# Patient Record
Sex: FEMALE | Race: BLACK OR AFRICAN AMERICAN | NOT HISPANIC OR LATINO | URBAN - METROPOLITAN AREA
[De-identification: names, ages, dates, MRNs, and addresses within clinical notes are randomized per-mention and may not be internally consistent; named-entity substitution may affect disease eponyms.]

---

## 2019-03-27 ENCOUNTER — OUTPATIENT (OUTPATIENT)
Dept: OUTPATIENT SERVICES | Facility: HOSPITAL | Age: 44
LOS: 1 days | End: 2019-03-27
Payer: COMMERCIAL

## 2019-03-27 VITALS
HEART RATE: 68 BPM | DIASTOLIC BLOOD PRESSURE: 89 MMHG | WEIGHT: 233.91 LBS | HEIGHT: 67 IN | SYSTOLIC BLOOD PRESSURE: 143 MMHG | TEMPERATURE: 97 F | RESPIRATION RATE: 16 BRPM | OXYGEN SATURATION: 98 %

## 2019-03-27 DIAGNOSIS — Z98.89 OTHER SPECIFIED POSTPROCEDURAL STATES: Chronic | ICD-10-CM

## 2019-03-27 DIAGNOSIS — I10 ESSENTIAL (PRIMARY) HYPERTENSION: ICD-10-CM

## 2019-03-27 DIAGNOSIS — N84.0 POLYP OF CORPUS UTERI: ICD-10-CM

## 2019-03-27 DIAGNOSIS — Z01.818 ENCOUNTER FOR OTHER PREPROCEDURAL EXAMINATION: ICD-10-CM

## 2019-03-27 DIAGNOSIS — Z98.890 OTHER SPECIFIED POSTPROCEDURAL STATES: Chronic | ICD-10-CM

## 2019-03-27 DIAGNOSIS — D36.9 BENIGN NEOPLASM, UNSPECIFIED SITE: Chronic | ICD-10-CM

## 2019-03-27 LAB
ANION GAP SERPL CALC-SCNC: 5 MMOL/L — SIGNIFICANT CHANGE UP (ref 5–17)
APTT BLD: 29.1 SEC — SIGNIFICANT CHANGE UP (ref 27.5–36.3)
BUN SERPL-MCNC: 10 MG/DL — SIGNIFICANT CHANGE UP (ref 7–18)
CALCIUM SERPL-MCNC: 8.3 MG/DL — LOW (ref 8.4–10.5)
CHLORIDE SERPL-SCNC: 107 MMOL/L — SIGNIFICANT CHANGE UP (ref 96–108)
CO2 SERPL-SCNC: 25 MMOL/L — SIGNIFICANT CHANGE UP (ref 22–31)
CREAT SERPL-MCNC: 0.72 MG/DL — SIGNIFICANT CHANGE UP (ref 0.5–1.3)
GLUCOSE SERPL-MCNC: 81 MG/DL — SIGNIFICANT CHANGE UP (ref 70–99)
HCT VFR BLD CALC: 37.5 % — SIGNIFICANT CHANGE UP (ref 34.5–45)
HGB BLD-MCNC: 11 G/DL — LOW (ref 11.5–15.5)
INR BLD: 1.07 RATIO — SIGNIFICANT CHANGE UP (ref 0.88–1.16)
MCHC RBC-ENTMCNC: 21.7 PG — LOW (ref 27–34)
MCHC RBC-ENTMCNC: 29.3 GM/DL — LOW (ref 32–36)
MCV RBC AUTO: 74.1 FL — LOW (ref 80–100)
NRBC # BLD: 0 /100 WBCS — SIGNIFICANT CHANGE UP (ref 0–0)
PLATELET # BLD AUTO: 316 K/UL — SIGNIFICANT CHANGE UP (ref 150–400)
POTASSIUM SERPL-MCNC: 4 MMOL/L — SIGNIFICANT CHANGE UP (ref 3.5–5.3)
POTASSIUM SERPL-SCNC: 4 MMOL/L — SIGNIFICANT CHANGE UP (ref 3.5–5.3)
PROTHROM AB SERPL-ACNC: 11.9 SEC — SIGNIFICANT CHANGE UP (ref 10–12.9)
RBC # BLD: 5.06 M/UL — SIGNIFICANT CHANGE UP (ref 3.8–5.2)
RBC # FLD: 18.3 % — HIGH (ref 10.3–14.5)
SODIUM SERPL-SCNC: 137 MMOL/L — SIGNIFICANT CHANGE UP (ref 135–145)
WBC # BLD: 5.94 K/UL — SIGNIFICANT CHANGE UP (ref 3.8–10.5)
WBC # FLD AUTO: 5.94 K/UL — SIGNIFICANT CHANGE UP (ref 3.8–10.5)

## 2019-03-27 PROCEDURE — 85027 COMPLETE CBC AUTOMATED: CPT

## 2019-03-27 PROCEDURE — G0463: CPT

## 2019-03-27 PROCEDURE — 86900 BLOOD TYPING SEROLOGIC ABO: CPT

## 2019-03-27 PROCEDURE — 86850 RBC ANTIBODY SCREEN: CPT

## 2019-03-27 PROCEDURE — 80048 BASIC METABOLIC PNL TOTAL CA: CPT

## 2019-03-27 PROCEDURE — 93005 ELECTROCARDIOGRAM TRACING: CPT

## 2019-03-27 PROCEDURE — 36415 COLL VENOUS BLD VENIPUNCTURE: CPT

## 2019-03-27 PROCEDURE — 86901 BLOOD TYPING SEROLOGIC RH(D): CPT

## 2019-03-27 PROCEDURE — 85730 THROMBOPLASTIN TIME PARTIAL: CPT

## 2019-03-27 PROCEDURE — 85610 PROTHROMBIN TIME: CPT

## 2019-03-27 PROCEDURE — 93010 ELECTROCARDIOGRAM REPORT: CPT

## 2019-03-27 RX ORDER — SODIUM CHLORIDE 9 MG/ML
3 INJECTION INTRAMUSCULAR; INTRAVENOUS; SUBCUTANEOUS EVERY 8 HOURS
Qty: 0 | Refills: 0 | Status: DISCONTINUED | OUTPATIENT
Start: 2019-04-04 | End: 2019-04-12

## 2019-03-27 NOTE — H&P PST ADULT - NSANTHNECKRD_ENT_A_CORE
February 26, 2018      Lashae Lopez,   [address]  [facility] [address]           [facility]  [address]  [facility] [address]  Phone: [phone]          Patient: Yasmani Mcknight   MR Number: [id number]   YOB: 2009   Date of Visit: 2/21/2018       Dear Dr. Lashae Lopez:    Thank you for referring Yasmani Mcknight to me for evaluation. Attached you will find relevant portions of my assessment and plan of care.    If you have questions, please do not hesitate to call me. I look forward to following Yasmani Mcknight along with you.    Sincerely,    Tiffany Saldaña, NP    Enclosure  CC:  No Recipients    If you would like to receive this communication electronically, please contact [email or web address] or [phone] to request more information on [employer] access.    For providers and/or their staff who would like to refer a patient to [facility], please contact us through our one-stop-shop provider referral line, [facility], at [phone].    If you feel you have received this communication in error or would no longer like to receive these types of communications, please e-mail [email or web address]         
No

## 2019-03-27 NOTE — H&P PST ADULT - NSICDXFAMILYHX_GEN_ALL_CORE_FT
FAMILY HISTORY:  Mother  Still living? Yes, Estimated age: 61-70  Family history of hypertension, Age at diagnosis: Age Unknown

## 2019-03-27 NOTE — H&P PST ADULT - NSICDXPASTMEDICALHX_GEN_ALL_CORE_FT
PAST MEDICAL HISTORY:  Anemia     Hypertension     Migraines     Obesity (BMI 30-39.9)     Uterine leiomyoma

## 2019-03-27 NOTE — H&P PST ADULT - HISTORY OF PRESENT ILLNESS
44year old female with pmhx of hypertension, migraine headaches, leiomyoma of uterus, left knee torn meniscus and benign right breast lump presents with c/o abdominal cramping and menorrhagia. Patient is here today for presurgical testing for scheduled dilation and curettage, hysteroscopy on 4/4/19

## 2019-03-27 NOTE — H&P PST ADULT - NSICDXPASTSURGICALHX_GEN_ALL_CORE_FT
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

## 2019-03-27 NOTE — H&P PST ADULT - NSICDXPROBLEM_GEN_ALL_CORE_FT
PROBLEM DIAGNOSES  Problem: Hypertension  Assessment and Plan: Patient instructed to take Nifedipine ER 60mg tab po with a sip of water the AM of surgery     Problem: Polyp of corpus uteri  Assessment and Plan: Patient is scheduled for dilation and curettage; hysteroscopy on 4/4/19

## 2019-04-03 ENCOUNTER — TRANSCRIPTION ENCOUNTER (OUTPATIENT)
Age: 44
End: 2019-04-03

## 2019-04-04 ENCOUNTER — OUTPATIENT (OUTPATIENT)
Dept: OUTPATIENT SERVICES | Facility: HOSPITAL | Age: 44
LOS: 1 days | End: 2019-04-04
Payer: COMMERCIAL

## 2019-04-04 ENCOUNTER — RESULT REVIEW (OUTPATIENT)
Age: 44
End: 2019-04-04

## 2019-04-04 VITALS
DIASTOLIC BLOOD PRESSURE: 72 MMHG | TEMPERATURE: 98 F | HEART RATE: 69 BPM | RESPIRATION RATE: 16 BRPM | OXYGEN SATURATION: 99 % | SYSTOLIC BLOOD PRESSURE: 126 MMHG

## 2019-04-04 VITALS
WEIGHT: 233.91 LBS | HEART RATE: 68 BPM | SYSTOLIC BLOOD PRESSURE: 126 MMHG | HEIGHT: 67 IN | TEMPERATURE: 98 F | OXYGEN SATURATION: 99 % | DIASTOLIC BLOOD PRESSURE: 72 MMHG | RESPIRATION RATE: 16 BRPM

## 2019-04-04 DIAGNOSIS — N84.0 POLYP OF CORPUS UTERI: ICD-10-CM

## 2019-04-04 DIAGNOSIS — Z01.818 ENCOUNTER FOR OTHER PREPROCEDURAL EXAMINATION: ICD-10-CM

## 2019-04-04 DIAGNOSIS — Z98.89 OTHER SPECIFIED POSTPROCEDURAL STATES: Chronic | ICD-10-CM

## 2019-04-04 DIAGNOSIS — Z98.890 OTHER SPECIFIED POSTPROCEDURAL STATES: Chronic | ICD-10-CM

## 2019-04-04 DIAGNOSIS — D36.9 BENIGN NEOPLASM, UNSPECIFIED SITE: Chronic | ICD-10-CM

## 2019-04-04 LAB — HCG UR QL: NEGATIVE — SIGNIFICANT CHANGE UP

## 2019-04-04 PROCEDURE — 86901 BLOOD TYPING SEROLOGIC RH(D): CPT

## 2019-04-04 PROCEDURE — 36415 COLL VENOUS BLD VENIPUNCTURE: CPT

## 2019-04-04 PROCEDURE — 86900 BLOOD TYPING SEROLOGIC ABO: CPT

## 2019-04-04 PROCEDURE — 58558 HYSTEROSCOPY BIOPSY: CPT

## 2019-04-04 PROCEDURE — 86850 RBC ANTIBODY SCREEN: CPT

## 2019-04-04 PROCEDURE — 81025 URINE PREGNANCY TEST: CPT

## 2019-04-04 RX ORDER — ACETAMINOPHEN 500 MG
2 TABLET ORAL
Qty: 30 | Refills: 0
Start: 2019-04-04 | End: 2019-04-08

## 2019-04-04 RX ORDER — IBUPROFEN 200 MG
600 TABLET ORAL EVERY 6 HOURS
Qty: 0 | Refills: 0 | Status: DISCONTINUED | OUTPATIENT
Start: 2019-04-04 | End: 2019-04-12

## 2019-04-04 RX ORDER — ONDANSETRON 8 MG/1
4 TABLET, FILM COATED ORAL ONCE
Qty: 0 | Refills: 0 | Status: DISCONTINUED | OUTPATIENT
Start: 2019-04-04 | End: 2019-04-04

## 2019-04-04 RX ORDER — NIFEDIPINE 30 MG
1 TABLET, EXTENDED RELEASE 24 HR ORAL
Qty: 0 | Refills: 0 | COMMUNITY

## 2019-04-04 RX ORDER — IBUPROFEN 200 MG
1 TABLET ORAL
Qty: 20 | Refills: 0
Start: 2019-04-04 | End: 2019-04-08

## 2019-04-04 RX ORDER — SODIUM CHLORIDE 9 MG/ML
1000 INJECTION, SOLUTION INTRAVENOUS
Qty: 0 | Refills: 0 | Status: DISCONTINUED | OUTPATIENT
Start: 2019-04-04 | End: 2019-04-04

## 2019-04-04 RX ORDER — FENTANYL CITRATE 50 UG/ML
50 INJECTION INTRAVENOUS
Qty: 0 | Refills: 0 | Status: DISCONTINUED | OUTPATIENT
Start: 2019-04-04 | End: 2019-04-04

## 2019-04-04 RX ADMIN — SODIUM CHLORIDE 3 MILLILITER(S): 9 INJECTION INTRAMUSCULAR; INTRAVENOUS; SUBCUTANEOUS at 09:23

## 2019-04-04 RX ADMIN — Medication 600 MILLIGRAM(S): at 13:19

## 2019-04-04 NOTE — ASU DISCHARGE PLAN (ADULT/PEDIATRIC) - ACTIVITY LEVEL
No tampons/Nothing per vagina/No tub baths/No intercourse/No douching/Nothing per rectum/No heavy lifting

## 2019-04-04 NOTE — ASU DISCHARGE PLAN (ADULT/PEDIATRIC) - CARE PROVIDER_API CALL
Donna Scott)  Obstetrics and Gynecology  [address] [place] [address]  Phone: [phone]  Fax: [phone]  Follow Up Time: 2 weeks

## 2019-04-04 NOTE — ASU DISCHARGE PLAN (ADULT/PEDIATRIC) - CALL YOUR DOCTOR IF YOU HAVE ANY OF THE FOLLOWING:
Bleeding that does not stop/Pain not relieved by Medications/Fever greater than (need to indicate Fahrenheit or Celsius)

## 2019-04-08 LAB — SURGICAL PATHOLOGY STUDY: SIGNIFICANT CHANGE UP

## 2020-07-13 PROBLEM — I10 ESSENTIAL (PRIMARY) HYPERTENSION: Chronic | Status: ACTIVE | Noted: 2019-03-27

## 2020-07-20 ENCOUNTER — OUTPATIENT (OUTPATIENT)
Dept: OUTPATIENT SERVICES | Facility: HOSPITAL | Age: 45
LOS: 1 days | End: 2020-07-20

## 2020-07-20 VITALS
HEIGHT: 64 IN | OXYGEN SATURATION: 98 % | TEMPERATURE: 98 F | WEIGHT: 167.99 LBS | RESPIRATION RATE: 16 BRPM | SYSTOLIC BLOOD PRESSURE: 136 MMHG | HEART RATE: 58 BPM | DIASTOLIC BLOOD PRESSURE: 84 MMHG

## 2020-07-20 DIAGNOSIS — Z01.818 ENCOUNTER FOR OTHER PREPROCEDURAL EXAMINATION: ICD-10-CM

## 2020-07-20 DIAGNOSIS — I10 ESSENTIAL (PRIMARY) HYPERTENSION: ICD-10-CM

## 2020-07-20 DIAGNOSIS — Z98.890 OTHER SPECIFIED POSTPROCEDURAL STATES: Chronic | ICD-10-CM

## 2020-07-20 DIAGNOSIS — D36.9 BENIGN NEOPLASM, UNSPECIFIED SITE: Chronic | ICD-10-CM

## 2020-07-20 DIAGNOSIS — Z98.89 OTHER SPECIFIED POSTPROCEDURAL STATES: Chronic | ICD-10-CM

## 2020-07-20 DIAGNOSIS — N84.0 POLYP OF CORPUS UTERI: ICD-10-CM

## 2020-07-20 DIAGNOSIS — N84.9 POLYP OF FEMALE GENITAL TRACT, UNSPECIFIED: ICD-10-CM

## 2020-07-20 RX ORDER — CHLORHEXIDINE GLUCONATE 213 G/1000ML
1 SOLUTION TOPICAL
Qty: 118 | Refills: 0
Start: 2020-07-20 | End: 2020-07-20

## 2020-07-20 NOTE — H&P PST ADULT - NSANTHOSAYNRD_GEN_A_CORE
No. GRICELDA screening performed.  STOP BANG Legend: 0-2 = LOW Risk; 3-4 = INTERMEDIATE Risk; 5-8 = HIGH Risk

## 2020-07-20 NOTE — H&P PST ADULT - MUSCULOSKELETAL
details… detailed exam no joint erythema/no calf tenderness/decreased ROM due to pain/no joint swelling/no joint warmth

## 2020-07-20 NOTE — H&P PST ADULT - HISTORY OF PRESENT ILLNESS
45year old female with pmhx of hypertension, migraine headaches, anemia, migraines, leiomyoma of uterus, left knee torn meniscus and benign right breast lump with c/o abdominal cramping and menorrhagia that has worsened. Patient communicated to via telephone due to Covid 19 for purpose of preoperative assessment/instructions for scheduled dilation and curettage, hysteroscopy on 7/24/20

## 2020-07-20 NOTE — H&P PST ADULT - NSICDXPASTMEDICALHX_GEN_ALL_CORE_FT
PAST MEDICAL HISTORY:  Anemia     Benign breast lumps     Hypertension     Migraines     Obesity (BMI 30-39.9)     Uterine leiomyoma

## 2020-07-21 ENCOUNTER — TRANSCRIPTION ENCOUNTER (OUTPATIENT)
Age: 45
End: 2020-07-21

## 2020-07-23 ENCOUNTER — TRANSCRIPTION ENCOUNTER (OUTPATIENT)
Age: 45
End: 2020-07-23

## 2020-07-24 ENCOUNTER — OUTPATIENT (OUTPATIENT)
Dept: OUTPATIENT SERVICES | Facility: HOSPITAL | Age: 45
LOS: 1 days | End: 2020-07-24
Payer: COMMERCIAL

## 2020-07-24 ENCOUNTER — RESULT REVIEW (OUTPATIENT)
Age: 45
End: 2020-07-24

## 2020-07-24 VITALS
DIASTOLIC BLOOD PRESSURE: 49 MMHG | OXYGEN SATURATION: 96 % | RESPIRATION RATE: 18 BRPM | TEMPERATURE: 98 F | SYSTOLIC BLOOD PRESSURE: 96 MMHG

## 2020-07-24 VITALS
HEART RATE: 58 BPM | RESPIRATION RATE: 18 BRPM | TEMPERATURE: 98 F | WEIGHT: 233.91 LBS | DIASTOLIC BLOOD PRESSURE: 84 MMHG | HEIGHT: 67 IN | OXYGEN SATURATION: 98 % | SYSTOLIC BLOOD PRESSURE: 136 MMHG

## 2020-07-24 DIAGNOSIS — Z98.89 OTHER SPECIFIED POSTPROCEDURAL STATES: Chronic | ICD-10-CM

## 2020-07-24 DIAGNOSIS — Z98.890 OTHER SPECIFIED POSTPROCEDURAL STATES: Chronic | ICD-10-CM

## 2020-07-24 DIAGNOSIS — N84.0 POLYP OF CORPUS UTERI: ICD-10-CM

## 2020-07-24 DIAGNOSIS — D36.9 BENIGN NEOPLASM, UNSPECIFIED SITE: Chronic | ICD-10-CM

## 2020-07-24 LAB
BLD GP AB SCN SERPL QL: SIGNIFICANT CHANGE UP
HCG UR QL: NEGATIVE — SIGNIFICANT CHANGE UP

## 2020-07-24 PROCEDURE — 88305 TISSUE EXAM BY PATHOLOGIST: CPT | Mod: 26

## 2020-07-24 PROCEDURE — 86901 BLOOD TYPING SEROLOGIC RH(D): CPT

## 2020-07-24 PROCEDURE — 88305 TISSUE EXAM BY PATHOLOGIST: CPT

## 2020-07-24 PROCEDURE — 81025 URINE PREGNANCY TEST: CPT

## 2020-07-24 PROCEDURE — 86900 BLOOD TYPING SEROLOGIC ABO: CPT

## 2020-07-24 PROCEDURE — 58558 HYSTEROSCOPY BIOPSY: CPT

## 2020-07-24 PROCEDURE — 36415 COLL VENOUS BLD VENIPUNCTURE: CPT

## 2020-07-24 PROCEDURE — 86850 RBC ANTIBODY SCREEN: CPT

## 2020-07-24 RX ORDER — HYDROMORPHONE HYDROCHLORIDE 2 MG/ML
0.5 INJECTION INTRAMUSCULAR; INTRAVENOUS; SUBCUTANEOUS
Refills: 0 | Status: DISCONTINUED | OUTPATIENT
Start: 2020-07-24 | End: 2020-07-24

## 2020-07-24 RX ORDER — SODIUM CHLORIDE 9 MG/ML
3 INJECTION INTRAMUSCULAR; INTRAVENOUS; SUBCUTANEOUS EVERY 8 HOURS
Refills: 0 | Status: DISCONTINUED | OUTPATIENT
Start: 2020-07-24 | End: 2020-07-24

## 2020-07-24 RX ORDER — ACETAMINOPHEN 500 MG
975 TABLET ORAL EVERY 6 HOURS
Refills: 0 | Status: DISCONTINUED | OUTPATIENT
Start: 2020-07-24 | End: 2020-08-01

## 2020-07-24 RX ORDER — SUMATRIPTAN SUCCINATE 4 MG/.5ML
1 INJECTION, SOLUTION SUBCUTANEOUS
Qty: 0 | Refills: 0 | DISCHARGE

## 2020-07-24 RX ORDER — ERGOCALCIFEROL 1.25 MG/1
1 CAPSULE ORAL
Qty: 0 | Refills: 0 | DISCHARGE

## 2020-07-24 RX ORDER — AMLODIPINE BESYLATE 2.5 MG/1
1 TABLET ORAL
Qty: 0 | Refills: 0 | DISCHARGE
Start: 2020-07-24

## 2020-07-24 RX ORDER — IBUPROFEN 200 MG
1 TABLET ORAL
Qty: 40 | Refills: 0
Start: 2020-07-24 | End: 2020-08-02

## 2020-07-24 RX ORDER — PROPRANOLOL HCL 160 MG
1 CAPSULE, EXTENDED RELEASE 24HR ORAL
Qty: 0 | Refills: 0 | DISCHARGE

## 2020-07-24 RX ORDER — ONDANSETRON 8 MG/1
4 TABLET, FILM COATED ORAL ONCE
Refills: 0 | Status: DISCONTINUED | OUTPATIENT
Start: 2020-07-24 | End: 2020-08-01

## 2020-07-24 RX ORDER — MORPHINE SULFATE 50 MG/1
4 CAPSULE, EXTENDED RELEASE ORAL EVERY 4 HOURS
Refills: 0 | Status: DISCONTINUED | OUTPATIENT
Start: 2020-07-24 | End: 2020-07-24

## 2020-07-24 RX ORDER — AMLODIPINE BESYLATE 2.5 MG/1
1 TABLET ORAL
Qty: 0 | Refills: 0 | DISCHARGE

## 2020-07-24 RX ORDER — KETOROLAC TROMETHAMINE 30 MG/ML
15 SYRINGE (ML) INJECTION EVERY 6 HOURS
Refills: 0 | Status: DISCONTINUED | OUTPATIENT
Start: 2020-07-24 | End: 2020-07-24

## 2020-07-24 RX ORDER — ONDANSETRON 8 MG/1
4 TABLET, FILM COATED ORAL ONCE
Refills: 0 | Status: DISCONTINUED | OUTPATIENT
Start: 2020-07-24 | End: 2020-07-24

## 2020-07-24 NOTE — ASU PATIENT PROFILE, ADULT - PSH
Benign tumor  removed from left hand in 2012  H/O foot surgery  right foot - 2011  H/O lumpectomy  right breast in 2006 - benign  H/O myomectomy  2013  History of shoulder surgery  Right shoulder arthroscopy and repair of rotator cuff

## 2020-07-24 NOTE — ASU DISCHARGE PLAN (ADULT/PEDIATRIC) - CALL YOUR DOCTOR IF YOU HAVE ANY OF THE FOLLOWING:
Fever greater than (need to indicate Fahrenheit or Celsius)/Wound/Surgical Site with redness, or foul smelling discharge or pus/Bleeding that does not stop

## 2020-07-24 NOTE — ASU DISCHARGE PLAN (ADULT/PEDIATRIC) - CARE PROVIDER_API CALL
Donna Scott  OBSTETRICS AND GYNECOLOGY  [address] [place] [address]  Phone: [phone]  Fax: [phone]  Follow Up Time: 2 weeks

## 2020-07-24 NOTE — ASU PATIENT PROFILE, ADULT - PMH
Anemia    Benign breast lumps    Hypertension    Migraines    Obesity (BMI 30-39.9)    Uterine leiomyoma

## 2020-07-29 LAB — SURGICAL PATHOLOGY STUDY: SIGNIFICANT CHANGE UP

## 2020-08-20 NOTE — H&P PST ADULT - TERM DELIVERIES, OB PROFILE
Hep c negative  Urine proteins but less than before   b12 normal  glyco better at 6.8, fbs 123  psa normal   Liver good  Kidney stable   Lipids LDL great at 24 HDL 33 keep up the activity, trig 181--watch carbs   Iron stores normal     Ct chest       CT chest 0

## 2020-12-14 ENCOUNTER — TRANSCRIPTION ENCOUNTER (OUTPATIENT)
Age: 45
End: 2020-12-14

## 2021-05-19 ENCOUNTER — EMERGENCY (EMERGENCY)
Facility: HOSPITAL | Age: 46
LOS: 1 days | Discharge: HOME | End: 2021-05-19
Attending: EMERGENCY MEDICINE
Payer: COMMERCIAL

## 2021-05-19 VITALS
RESPIRATION RATE: 18 BRPM | HEIGHT: 67 IN | DIASTOLIC BLOOD PRESSURE: 90 MMHG | OXYGEN SATURATION: 99 % | HEART RATE: 62 BPM | TEMPERATURE: 98 F | WEIGHT: 244.93 LBS | SYSTOLIC BLOOD PRESSURE: 161 MMHG

## 2021-05-19 VITALS
TEMPERATURE: 99 F | SYSTOLIC BLOOD PRESSURE: 120 MMHG | RESPIRATION RATE: 17 BRPM | DIASTOLIC BLOOD PRESSURE: 74 MMHG | HEART RATE: 60 BPM | OXYGEN SATURATION: 97 %

## 2021-05-19 DIAGNOSIS — Z98.89 OTHER SPECIFIED POSTPROCEDURAL STATES: Chronic | ICD-10-CM

## 2021-05-19 DIAGNOSIS — Z98.890 OTHER SPECIFIED POSTPROCEDURAL STATES: Chronic | ICD-10-CM

## 2021-05-19 DIAGNOSIS — D36.9 BENIGN NEOPLASM, UNSPECIFIED SITE: Chronic | ICD-10-CM

## 2021-05-19 PROBLEM — N63.0 UNSPECIFIED LUMP IN UNSPECIFIED BREAST: Chronic | Status: ACTIVE | Noted: 2020-07-20

## 2021-05-19 LAB
ALBUMIN SERPL ELPH-MCNC: 3.1 G/DL — LOW (ref 3.5–5)
ALP SERPL-CCNC: 85 U/L — SIGNIFICANT CHANGE UP (ref 40–120)
ALT FLD-CCNC: 19 U/L DA — SIGNIFICANT CHANGE UP (ref 10–60)
ANION GAP SERPL CALC-SCNC: 7 MMOL/L — SIGNIFICANT CHANGE UP (ref 5–17)
APPEARANCE UR: CLEAR — SIGNIFICANT CHANGE UP
AST SERPL-CCNC: 19 U/L — SIGNIFICANT CHANGE UP (ref 10–40)
BASOPHILS # BLD AUTO: 0.08 K/UL — SIGNIFICANT CHANGE UP (ref 0–0.2)
BASOPHILS NFR BLD AUTO: 1.1 % — SIGNIFICANT CHANGE UP (ref 0–2)
BILIRUB SERPL-MCNC: 0.4 MG/DL — SIGNIFICANT CHANGE UP (ref 0.2–1.2)
BILIRUB UR-MCNC: NEGATIVE — SIGNIFICANT CHANGE UP
BUN SERPL-MCNC: 12 MG/DL — SIGNIFICANT CHANGE UP (ref 7–18)
CALCIUM SERPL-MCNC: 9.4 MG/DL — SIGNIFICANT CHANGE UP (ref 8.4–10.5)
CHLORIDE SERPL-SCNC: 107 MMOL/L — SIGNIFICANT CHANGE UP (ref 96–108)
CO2 SERPL-SCNC: 25 MMOL/L — SIGNIFICANT CHANGE UP (ref 22–31)
COLOR SPEC: YELLOW — SIGNIFICANT CHANGE UP
CREAT SERPL-MCNC: 0.77 MG/DL — SIGNIFICANT CHANGE UP (ref 0.5–1.3)
DIFF PNL FLD: NEGATIVE — SIGNIFICANT CHANGE UP
EOSINOPHIL # BLD AUTO: 0.44 K/UL — SIGNIFICANT CHANGE UP (ref 0–0.5)
EOSINOPHIL NFR BLD AUTO: 6.2 % — HIGH (ref 0–6)
GLUCOSE SERPL-MCNC: 113 MG/DL — HIGH (ref 70–99)
GLUCOSE UR QL: NEGATIVE — SIGNIFICANT CHANGE UP
HCG UR QL: NEGATIVE — SIGNIFICANT CHANGE UP
HCT VFR BLD CALC: 37.4 % — SIGNIFICANT CHANGE UP (ref 34.5–45)
HGB BLD-MCNC: 11 G/DL — LOW (ref 11.5–15.5)
IMM GRANULOCYTES NFR BLD AUTO: 0.3 % — SIGNIFICANT CHANGE UP (ref 0–1.5)
KETONES UR-MCNC: NEGATIVE — SIGNIFICANT CHANGE UP
LEUKOCYTE ESTERASE UR-ACNC: NEGATIVE — SIGNIFICANT CHANGE UP
LYMPHOCYTES # BLD AUTO: 2.04 K/UL — SIGNIFICANT CHANGE UP (ref 1–3.3)
LYMPHOCYTES # BLD AUTO: 28.7 % — SIGNIFICANT CHANGE UP (ref 13–44)
MCHC RBC-ENTMCNC: 21.3 PG — LOW (ref 27–34)
MCHC RBC-ENTMCNC: 29.4 GM/DL — LOW (ref 32–36)
MCV RBC AUTO: 72.3 FL — LOW (ref 80–100)
MONOCYTES # BLD AUTO: 0.52 K/UL — SIGNIFICANT CHANGE UP (ref 0–0.9)
MONOCYTES NFR BLD AUTO: 7.3 % — SIGNIFICANT CHANGE UP (ref 2–14)
NEUTROPHILS # BLD AUTO: 4.01 K/UL — SIGNIFICANT CHANGE UP (ref 1.8–7.4)
NEUTROPHILS NFR BLD AUTO: 56.4 % — SIGNIFICANT CHANGE UP (ref 43–77)
NITRITE UR-MCNC: NEGATIVE — SIGNIFICANT CHANGE UP
NRBC # BLD: 0 /100 WBCS — SIGNIFICANT CHANGE UP (ref 0–0)
PH UR: 6 — SIGNIFICANT CHANGE UP (ref 5–8)
PLATELET # BLD AUTO: 329 K/UL — SIGNIFICANT CHANGE UP (ref 150–400)
POTASSIUM SERPL-MCNC: 4.3 MMOL/L — SIGNIFICANT CHANGE UP (ref 3.5–5.3)
POTASSIUM SERPL-SCNC: 4.3 MMOL/L — SIGNIFICANT CHANGE UP (ref 3.5–5.3)
PROT SERPL-MCNC: 7.2 G/DL — SIGNIFICANT CHANGE UP (ref 6–8.3)
PROT UR-MCNC: NEGATIVE — SIGNIFICANT CHANGE UP
RBC # BLD: 5.17 M/UL — SIGNIFICANT CHANGE UP (ref 3.8–5.2)
RBC # FLD: 18.2 % — HIGH (ref 10.3–14.5)
SODIUM SERPL-SCNC: 139 MMOL/L — SIGNIFICANT CHANGE UP (ref 135–145)
SP GR SPEC: 1.02 — SIGNIFICANT CHANGE UP (ref 1.01–1.02)
UROBILINOGEN FLD QL: NEGATIVE — SIGNIFICANT CHANGE UP
WBC # BLD: 7.11 K/UL — SIGNIFICANT CHANGE UP (ref 3.8–10.5)
WBC # FLD AUTO: 7.11 K/UL — SIGNIFICANT CHANGE UP (ref 3.8–10.5)

## 2021-05-19 PROCEDURE — 36415 COLL VENOUS BLD VENIPUNCTURE: CPT

## 2021-05-19 PROCEDURE — 96374 THER/PROPH/DIAG INJ IV PUSH: CPT

## 2021-05-19 PROCEDURE — 87086 URINE CULTURE/COLONY COUNT: CPT

## 2021-05-19 PROCEDURE — 76856 US EXAM PELVIC COMPLETE: CPT | Mod: 26

## 2021-05-19 PROCEDURE — 76856 US EXAM PELVIC COMPLETE: CPT

## 2021-05-19 PROCEDURE — 85025 COMPLETE CBC W/AUTO DIFF WBC: CPT

## 2021-05-19 PROCEDURE — 81003 URINALYSIS AUTO W/O SCOPE: CPT

## 2021-05-19 PROCEDURE — 80053 COMPREHEN METABOLIC PANEL: CPT

## 2021-05-19 PROCEDURE — 99285 EMERGENCY DEPT VISIT HI MDM: CPT

## 2021-05-19 PROCEDURE — 81025 URINE PREGNANCY TEST: CPT

## 2021-05-19 PROCEDURE — 99284 EMERGENCY DEPT VISIT MOD MDM: CPT | Mod: 25

## 2021-05-19 RX ORDER — SODIUM CHLORIDE 9 MG/ML
1000 INJECTION INTRAMUSCULAR; INTRAVENOUS; SUBCUTANEOUS ONCE
Refills: 0 | Status: COMPLETED | OUTPATIENT
Start: 2021-05-19 | End: 2021-05-19

## 2021-05-19 RX ORDER — MORPHINE SULFATE 50 MG/1
4 CAPSULE, EXTENDED RELEASE ORAL ONCE
Refills: 0 | Status: DISCONTINUED | OUTPATIENT
Start: 2021-05-19 | End: 2021-05-19

## 2021-05-19 RX ADMIN — MORPHINE SULFATE 4 MILLIGRAM(S): 50 CAPSULE, EXTENDED RELEASE ORAL at 07:35

## 2021-05-19 RX ADMIN — SODIUM CHLORIDE 1000 MILLILITER(S): 9 INJECTION INTRAMUSCULAR; INTRAVENOUS; SUBCUTANEOUS at 07:35

## 2021-05-19 NOTE — ED PROVIDER NOTE - OBJECTIVE STATEMENT
45yo F with HTN, migraines and uterine fibroids presents with difficulty urinating and pelvic pain. Reports onset last night when she was only able to urinate small amounts at a time. Reports she last urinated at 1030pm. This morning pelvic pain has worsened now 8/10 pain and unable to urinate at all. Denies fevers, vomiting, diarrhea. Reports LMP 5/14 only lasting 3 days which is unusual for patient which reports menses usually lasts 5 days. Denies hx frequent UTIs.

## 2021-05-19 NOTE — ED ADULT NURSE NOTE - NSIMPLEMENTINTERV_GEN_ALL_ED
Implemented All Universal Safety Interventions:  [place] to call system. Call bell, personal items and telephone within reach. Instruct patient to call for assistance. Room bathroom lighting operational. Non-slip footwear when patient is off stretcher. Physically safe environment: no spills, clutter or unnecessary equipment. Stretcher in lowest position, wheels locked, appropriate side rails in place.

## 2021-05-19 NOTE — ED PROVIDER NOTE - PATIENT PORTAL LINK FT
You can access the FollowMyHealth Patient Portal offered by [facility] by registering at the following website: [email or web address]. By joining [employer]’s FollowMyHealth portal, you will also be able to view your health information using other applications (apps) compatible with our system.

## 2021-05-19 NOTE — ED PROVIDER NOTE - NEURO NEGATIVE STATEMENT, MLM
no loss of consciousness, no gait abnormality, no headache, no sensory deficits, and no weakness. 50.5

## 2021-05-19 NOTE — ED PROVIDER NOTE - NSFOLLOWUPINSTRUCTIONS_ED_ALL_ED_FT
Uterine Fibroids    WHAT YOU NEED TO KNOW:    What are uterine fibroids? Uterine fibroids are growths found inside your uterus. Uterine fibroids also may be called tumors or leiomyomas. Uterine fibroids often appear in groups, or you may have only one. They can be small or large, and they can grow. They are almost always benign (not cancer) and likely will not spread to other parts of your body. They may grow when you are pregnant and shrink after you no longer have a monthly period.    Uterine Fibroid         What increases my risk for uterine fibroids? The cause of uterine fibroids is not clear. Ask your healthcare provider about these and other risk factors for uterine fibroids:  •A family history of uterine fibroids      •Too many female hormones, especially estrogen      •Menstrual periods starting before age 13      •Too much body weight      •Not having children      •Drinking alcohol      What are the signs and symptoms of uterine fibroids? You may have no signs or symptoms. Symptoms depend on the size, type, and number of fibroids you have. Symptoms also depend on where the fibroids are inside your uterus:  •Heavy or painful menstrual bleeding      •Pelvic pressure and pain      •Increased pelvic pain during sex      •Constipation or pain when you have a bowel movement      •Need to urinate often      How are uterine fibroids diagnosed? Your healthcare provider will examine you and ask about your symptoms. Tell the provider if any women if your family have had uterine fibroids. You may also need any of the following:   •A pelvic exam is also called an internal or vaginal exam. During a pelvic exam, your healthcare provider gently puts a speculum into your vagina. A speculum is a tool that opens your vagina. This lets your healthcare provider see your cervix (bottom part of your uterus). With gloved hands, your healthcare provider will check the size and shape of your uterus and ovaries.       •A vaginal ultrasound is used to see inside your uterus and to check your ovaries. During this test, your healthcare provider places a small wand in your vagina. Sound waves from the wand show pictures of the uterus and ovaries.      •A biopsy is a tissue sample of a fibroid that your healthcare provider takes from your uterus for testing.      How are uterine fibroids treated? You may not need treatment for your fibroids if you do not have symptoms. The following treatments may shrink your fibroids and help your pain:   •Hormones may help shrink your fibroids.      •Contraceptives help prevent pregnancy. They also may help shrink your fibroids.      •NSAIDs help decrease swelling and pain or fever. This medicine is available with or without a doctor's order. NSAIDs can cause stomach bleeding or kidney problems in certain people. If you take blood thinner medicine, always ask your healthcare provider if NSAIDs are safe for you. Always read the medicine label and follow directions.      •Surgery may be used to remove your uterine fibroids. Surgery may instead be used to block or slow the flow of blood to the fibroid. This may make your fibroids shrink or disappear. Surgery called a hysterectomy may be needed if your fibroids are severe. For this surgery, your healthcare provider removes your entire uterus. After this surgery, you will no longer be able to have children.      What can I do to prevent uterine fibroids?   •Maintain a healthy weight. Extra weight can cause fibroids to grow. Talk to your healthcare provider about a healthy weight for you. He or she can help you create a healthy weight loss plan if you are overweight.      •Eat a variety of healthy foods. Healthy foods include fruits, vegetables, lean meats, fish, low-fat dairy foods, cooked beans, and whole-grain breads and cereals. Fruits and vegetables are especially important for helping lower the risk for fibroids. Your healthcare provider or a dietitian can help you create a healthy meal plan.  Healthy Foods           •Limit or do not drink alcohol as directed. Alcohol can increase your risk for fibroids. A drink of alcohol is 12 ounces of beer, 1½ ounces of liquor, or 5 ounces of wine. Ask your healthcare provider for information if you need help to quit drinking alcohol.      When should I seek immediate care?   •Your heart begins to race, and you feel faint.      •You begin to pass large blood clots from your vagina.      When should I call my doctor or gynecologist?   •Your symptoms, such as heavy bleeding, pain, or pelvic pressure, worsen.      •You feel weak and are more tired than usual.      •You do not feel like your bladder is empty after you urinate. You also may urinate small amounts more often.       •You have questions or concerns about your condition or care.      CARE AGREEMENT:    You have the right to help plan your care. Learn about your health condition and how it may be treated. Discuss treatment options with your healthcare providers to decide what care you want to receive. You always have the right to refuse treatment.

## 2021-05-19 NOTE — ED PROVIDER NOTE - CLINICAL SUMMARY MEDICAL DECISION MAKING FREE TEXT BOX
47yo F with HTN, migraines and uterine fibroids presents with difficulty urinating and pelvic pain. Will obtain labs, UA, pelvic US.  if unable to urinate will obtain bladder scan.  patient signedout to Dr. Gallardo at 730am.

## 2021-05-20 LAB
CULTURE RESULTS: NO GROWTH — SIGNIFICANT CHANGE UP
SPECIMEN SOURCE: SIGNIFICANT CHANGE UP

## 2021-07-21 ENCOUNTER — APPOINTMENT (OUTPATIENT)
Dept: OTOLARYNGOLOGY | Facility: CLINIC | Age: 46
End: 2021-07-21
Payer: COMMERCIAL

## 2021-07-21 VITALS
RESPIRATION RATE: 14 BRPM | HEIGHT: 68 IN | SYSTOLIC BLOOD PRESSURE: 123 MMHG | BODY MASS INDEX: 36.37 KG/M2 | TEMPERATURE: 98.2 F | HEART RATE: 62 BPM | WEIGHT: 240 LBS | OXYGEN SATURATION: 98 % | DIASTOLIC BLOOD PRESSURE: 81 MMHG

## 2021-07-21 DIAGNOSIS — Z80.9 FAMILY HISTORY OF MALIGNANT NEOPLASM, UNSPECIFIED: ICD-10-CM

## 2021-07-21 DIAGNOSIS — Z82.49 FAMILY HISTORY OF ISCHEMIC HEART DISEASE AND OTHER DISEASES OF THE CIRCULATORY SYSTEM: ICD-10-CM

## 2021-07-21 DIAGNOSIS — G43.909 MIGRAINE, UNSPECIFIED, NOT INTRACTABLE, W/OUT STATUS MIGRAINOSUS: ICD-10-CM

## 2021-07-21 DIAGNOSIS — Z78.9 OTHER SPECIFIED HEALTH STATUS: ICD-10-CM

## 2021-07-21 DIAGNOSIS — H92.01 OTALGIA, RIGHT EAR: ICD-10-CM

## 2021-07-21 DIAGNOSIS — Z86.018 PERSONAL HISTORY OF OTHER BENIGN NEOPLASM: ICD-10-CM

## 2021-07-21 DIAGNOSIS — Z86.79 PERSONAL HISTORY OF OTHER DISEASES OF THE CIRCULATORY SYSTEM: ICD-10-CM

## 2021-07-21 DIAGNOSIS — R13.14 DYSPHAGIA, PHARYNGOESOPHAGEAL PHASE: ICD-10-CM

## 2021-07-21 DIAGNOSIS — R07.0 PAIN IN THROAT: ICD-10-CM

## 2021-07-21 PROCEDURE — 99203 OFFICE O/P NEW LOW 30 MIN: CPT | Mod: 25

## 2021-07-21 PROCEDURE — 31575 DIAGNOSTIC LARYNGOSCOPY: CPT

## 2021-07-21 RX ORDER — AMLODIPINE BESYLATE 10 MG/1
10 TABLET ORAL
Refills: 0 | Status: ACTIVE | COMMUNITY

## 2021-07-21 RX ORDER — PROPRANOLOL HYDROCHLORIDE 80 MG/1
TABLET ORAL
Refills: 0 | Status: ACTIVE | COMMUNITY

## 2021-07-22 PROBLEM — R13.14 DYSPHAGIA, PHARYNGOESOPHAGEAL: Status: ACTIVE | Noted: 2021-07-22

## 2021-07-22 PROBLEM — H92.01 RIGHT EAR PAIN: Status: ACTIVE | Noted: 2021-07-21

## 2021-07-22 NOTE — REASON FOR VISIT
[Initial Evaluation] : an initial evaluation for [FreeTextEntry2] : r otalgia and throat pain with swallowing

## 2021-07-22 NOTE — HISTORY OF PRESENT ILLNESS
[de-identified] : 45 yo woman with r otalgia and throat pain with swallowing for the past 3 months. She feels the pain radiates from her ear. She denies hl or tinnitus. She grinds her teeth. The pain wakes her up at night and she takes motrin and feels better. Nonsmoker. No fh or sh relevant to cc. The pain can be sharp and is moderate in severity worse with opening and closing mouth.

## 2021-07-22 NOTE — PROCEDURE
[Unable to Cooperate with Mirror] : patient unable to cooperate with mirror [Complicated Symptoms] : complicated symptoms requiring more thorough examination than provided by mirror [Flexible Endoscope] : examined with the flexible endoscope [Serial Number: ___] : Serial Number: [unfilled] [Normal] : posterior cricoid area had healthy pink mucosa in the interarytenoid area and the esophageal inlet [de-identified] : done for some difficulty/discomfort with swallowing>>normal

## 2021-07-22 NOTE — ASSESSMENT
[FreeTextEntry1] : scope normal\par this appears to be tmj\par she agreed  when explained\par soft diet\par motrin (does not tolerate naproxen)\par avoid bruxism\par no gum\par see dentist (she has one) for mouth guard\par rtc 2 weeks to check

## 2021-07-22 NOTE — HISTORY OF PRESENT ILLNESS
[de-identified] : 47 yo woman with r otalgia and throat pain with swallowing for the past 3 months. She feels the pain radiates from her ear. She denies hl or tinnitus. She grinds her teeth. The pain wakes her up at night and she takes motrin and feels better. Nonsmoker. No fh or sh relevant to cc. The pain can be sharp and is moderate in severity worse with opening and closing mouth.

## 2021-07-22 NOTE — PHYSICAL EXAM
[de-identified] : r>l [Midline] : trachea located in midline position [Laryngoscopy Performed] : laryngoscopy was performed, see procedure section for findings [Normal] : no rashes

## 2021-07-22 NOTE — PROCEDURE
[Unable to Cooperate with Mirror] : patient unable to cooperate with mirror [Complicated Symptoms] : complicated symptoms requiring more thorough examination than provided by mirror [Flexible Endoscope] : examined with the flexible endoscope [Serial Number: ___] : Serial Number: [unfilled] [Normal] : posterior cricoid area had healthy pink mucosa in the interarytenoid area and the esophageal inlet [de-identified] : done for some difficulty/discomfort with swallowing>>normal

## 2021-07-22 NOTE — PHYSICAL EXAM
[de-identified] : r>l [Midline] : trachea located in midline position [Laryngoscopy Performed] : laryngoscopy was performed, see procedure section for findings [Normal] : no rashes

## 2021-09-29 ENCOUNTER — TRANSCRIPTION ENCOUNTER (OUTPATIENT)
Age: 46
End: 2021-09-29

## 2022-09-01 ENCOUNTER — APPOINTMENT (OUTPATIENT)
Dept: ORTHOPEDIC SURGERY | Facility: CLINIC | Age: 47
End: 2022-09-01

## 2022-09-01 ENCOUNTER — NON-APPOINTMENT (OUTPATIENT)
Age: 47
End: 2022-09-01

## 2022-09-01 PROCEDURE — 99203 OFFICE O/P NEW LOW 30 MIN: CPT

## 2022-09-01 PROCEDURE — 73070 X-RAY EXAM OF ELBOW: CPT | Mod: LT

## 2022-09-01 NOTE — DISCUSSION/SUMMARY
[de-identified] : I am concerned that she still has symptoms 4 weeks after her fall that she may have a radial head occult fracture. I recommend getting an MRI. I will call her with the results. Further recommendations after the MRI.

## 2022-09-01 NOTE — PHYSICAL EXAM
[de-identified] : Left elbow shows some residual swelling there is tenderness over the radial head she lacks about 10° of extension there is pain with rotation but she has full supination and pronation her flexion is full her neurovascular exam is normal. [de-identified] : Stable x-ray shows no evidence of acute fracture dislocation.

## 2022-09-01 NOTE — HISTORY OF PRESENT ILLNESS
[de-identified] : Location:left elbow\par Quality:shooting, throbbing\par Duration:8/22/22\par Context:fell on the street\par Aggravating Factors:putting on clothes, lifting \par Conservative treatment:heat, ice, otc medication \par Associated Symptoms: swelling\par Prior Studies:xray images

## 2022-10-10 ENCOUNTER — APPOINTMENT (OUTPATIENT)
Dept: ORTHOPEDIC SURGERY | Facility: CLINIC | Age: 47
End: 2022-10-10

## 2022-10-10 DIAGNOSIS — M25.622 STIFFNESS OF LEFT ELBOW, NOT ELSEWHERE CLASSIFIED: ICD-10-CM

## 2022-10-10 PROCEDURE — 99213 OFFICE O/P EST LOW 20 MIN: CPT

## 2022-10-10 NOTE — REVIEW OF SYSTEMS
[Negative] : Heme/Lymph [Arthralgia] : arthralgia [Joint Pain] : no joint pain [Joint Stiffness] : joint stiffness [Joint Swelling] : joint swelling

## 2022-10-10 NOTE — DISCUSSION/SUMMARY
[de-identified] : I recommended that she do some physical therapy. She will do the icing. She'll continue with range of motion. I did apply an Ace bandage for her to be reminded to be careful of lifting turning and twisting quickly. She will not use this full-time she will follow up in 3 weeks for a clinical check.

## 2022-10-10 NOTE — PHYSICAL EXAM
[de-identified] : Left talus is no obvious warmth or swelling. There is tenderness over the lateral epicondyle. She lacks about 10° of extension and her flexion is full normal pronation and supination and neurovascular exam is normal [de-identified] : MRI of the left elbow is in the chart

## 2022-10-10 NOTE — HISTORY OF PRESENT ILLNESS
[de-identified] : Patient is following up on left elbow pain, reports no changes. Stretching made problem worse. \par She continues to remain symptomatic. She is about 7 or 8 weeks now. She has a common extensor tendon partial tear traumatically. She still is getting some swelling. She did have a recent episode where she trying to a suitcase up of her head and it pulled on the elbow and aggravated it.

## 2022-10-31 ENCOUNTER — APPOINTMENT (OUTPATIENT)
Dept: ORTHOPEDIC SURGERY | Facility: CLINIC | Age: 47
End: 2022-10-31

## 2022-10-31 DIAGNOSIS — M70.32 OTHER BURSITIS OF ELBOW, LEFT ELBOW: ICD-10-CM

## 2022-10-31 PROCEDURE — 99213 OFFICE O/P EST LOW 20 MIN: CPT

## 2022-10-31 NOTE — PHYSICAL EXAM
[de-identified] : Left elbow shows no warmth there is some mild swelling over the lateral epicondyle region her range of motion is full. She still has some pain with resisted extension of the wrist and digits and her neurovascular exam is normal.

## 2022-10-31 NOTE — HISTORY OF PRESENT ILLNESS
[de-identified] : This patient is here for followup of her left elbow. Symptoms haven't improved. Actually backing off on the physical therapy has been helpful. No numbness or tingling.

## 2022-10-31 NOTE — DISCUSSION/SUMMARY
[de-identified] : Will finish up her physical therapy this week. She'll do home stuff. She'll use ice she will be careful it will heal by itself follow up as needed.

## 2022-12-01 ENCOUNTER — APPOINTMENT (OUTPATIENT)
Dept: ORTHOPEDIC SURGERY | Facility: CLINIC | Age: 47
End: 2022-12-01

## 2022-12-01 DIAGNOSIS — M25.572 PAIN IN LEFT ANKLE AND JOINTS OF LEFT FOOT: ICD-10-CM

## 2022-12-01 PROCEDURE — 73630 X-RAY EXAM OF FOOT: CPT | Mod: LT

## 2022-12-01 PROCEDURE — 99213 OFFICE O/P EST LOW 20 MIN: CPT

## 2022-12-01 NOTE — HISTORY OF PRESENT ILLNESS
[de-identified] : Location:left foot\par Quality:achy\par Duration:years\par Context:intermittent \par Aggravating Factors:sitting, going from sitting to stnaidng\par Conservative treatment:ice pack \par Associated Symptoms:swelling \par Prior Studies:n/a\par

## 2022-12-01 NOTE — PHYSICAL EXAM
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

## 2022-12-01 NOTE — DISCUSSION/SUMMARY
[de-identified] : She's had this going for many years. She like to proceed with surgery. I will ask her to go see Dr. Pandya for a surgical consultation. The procedure was discussed with her. Followup will be as needed.

## 2024-07-30 NOTE — H&P PST ADULT - DOES PATIENT MEET CRITERIA FOR SEPSIS
July 30, 2024       Nicole Beltran MD  [address]  [facility] [address]  Via Fax: [phone]      Patient: Carol Florez   YOB: 1970   Date of Visit: 7/30/2024       Dear Dr. Beltran:    I saw your patient, Carol Florez, for an evaluation. Below are my notes for this visit with her.    If you have questions, please do not hesitate to call me.      Sincerely,        Jarrell Morris MD        CC: No Recipients  Jarrell Morris MD  7/30/2024 10:30 AM  Signed  Chief Complaint   Patient presents with   • Injury     Left base of great toe painful after going up the stairs last night felt better after finished stairs but very painful throughout the night.       History Of Present Illness  Carol is a 54 year old female presenting with foot pain after climbing stairs last night  Injured great toe left first MTP region, felt immediate pain.    Pain with ambulation, weight bearing.    Improved with rest.    Past Medical History  Past Medical History:   Diagnosis Date   • Anemia     iron def anemia   • Fibroids     uterine   • Pelvic pain         Surgical History  Past Surgical History:   Procedure Laterality Date   • Embolization uterine fibroid  2015        Social History   reports that she has never smoked. She has never used smokeless tobacco.    Family History  History reviewed. No pertinent family history.     Allergies  ALLERGIES:  Patient has no known allergies.    Medications  Current Outpatient Medications   Medication Sig Dispense Refill   • rosuvastatin (CRESTOR) 10 MG tablet Take 10 mg by mouth daily.     • telmisartan (MICARDIS) 80 MG tablet Take 80 mg by mouth daily.     • nebivolol (BYSTOLIC) 5 MG tablet Take 5 mg by mouth daily.     • celecoxib (CeleBREX) 200 MG capsule Take 1 capsule by mouth 2 times daily for 14 days. 28 capsule 0   • gabapentin (NEURONTIN) 300 MG capsule Take 1 capsule by mouth 3 times daily for 14 days. 42 capsule 0     No current  facility-administered medications for this visit.       Review of Systems      Eye Problem(s):negative  ENT Problem(s):negative  Cardiovascular problem(s):negative  Respiratory problem(s):negative  Gastro-intestinal problem(s):negative   Genito-urinary problem(s): negative  Musculoskeletal problem(s):foot pain  Integumentary problem(s):negative  Neurological problem(s):negative  Psychiatric problem(s):negative  Endocrine problem(s):negative  Hematologic and/or Lymphatic problem(s):negative    Last Recorded Vitals  Visit Vitals  /78 (BP Location: RUE - Right upper extremity)   Pulse 63   Temp 98.3 °F (36.8 °C) (Temporal)   Resp 16   Wt 77.1 kg (170 lb)   LMP  (Exact Date)   SpO2 98%   BMI 30.50 kg/m²           Physical Exam    General:  Alert, cooperative, conversive. No acute distress.  Skin:  Warm and dry without rash.    Head:  Normocephalic-atraumatic.   Neck:  Trachea is midline.     Eyes:  Normal conjunctivae and sclerae.  ENT:  Mucous membranes are moist.  Cardiovascular:  2+ dorsalis pedis, cap < 2 sec at toes.   Respiratory:  Normal respiratory effort.   Gastrointestinal:  Non-distended.    Musculoskeletal:  Tender left first MTP region, pain with compression/palpation.  Mild ecch, no swelling.  Ankle non-tender, no swelling, FROM.  Neurologic:  Oriented times 4.  No focal deficits.   5/5 Motor and sensorium.      Radiology Report    XR FOOT 3 OR MORE VIEWS LEFT  Narrative: EXAM: XR FOOT 3 OR MORE VIEWS LEFT    CLINICAL INDICATION: First MTP joint pain since last night.    COMPARISON: None available.    FINDINGS: The left foot is viewed in three projections.  There is no  evidence of acute osseous injury or radiopaque foreign body. Degenerative  changes are seen at the first MTP joint as well as mildly along the dorsum  of the midfoot with large calcaneal spurs also noted.  Impression: 1. Degenerative changes and calcaneal spurs without evidence of acute  osseous injury.    Electronically Signed by:  JADEN HOWARD M.D.   Signed on: 7/30/2024 10:01 AM   Workstation ID: [id number]        Medical Decision Making    Multiple differential diagnoses were considered. The patient was apprised of diagnostic and treatment options including alternate modes of care, in addition to risks and benefits, for this medical condition. Based on this discussion the patient agrees with this chosen diagnostic and treatment plan.        Factors Influencing Medical Decision Making    Labs Ordered/Results reviewed:  No    X-ray ordered/reviewed: Yes    I independently reviewed X-rays/Labs: Yes    Previous medical records reviewed: No    Additional Historians: No    Differential Diagnosis: Sprain, Strain, Fracture, Contusion    Differential Diagnoses ruled out: NA    Chronic Conditions/Social Factors affecting MDM: None      Risk of Complications, Morbidity, and/or Mortality:    Presenting problems:  Moderate    Diagnostic procedures:  Moderate    Management options:  Moderate          Diagnosis    Foot sprain, left, initial encounter  (primary encounter diagnosis)      Plan    Follow-up Information     Follow up With Specialties Details Why Contact Info    Tomás Cruz MD Orthopaedic Surgery Sports Medicine, Orthopedic Surgery Follow up Within 1-3 days [address] [facility] [address] [place] [id number] [phone]               Summary of your Discharge Medications          Accurate as of July 30, 2024 10:30 AM. Always use your most recent med list.            Take these Medications      Details   celecoxib 200 MG capsule  Commonly known as: CeleBREX   Take 1 capsule by mouth 2 times daily for 14 days.     gabapentin 300 MG capsule  Commonly known as: NEURONTIN   Take 1 capsule by mouth 3 times daily for 14 days.     nebivolol 5 MG tablet  Commonly known as: BYSTOLIC   Take 5 mg by mouth daily.     rosuvastatin 10 MG tablet  Commonly known as: CRESTOR   Take 10 mg by mouth daily.     telmisartan 80 MG tablet  Commonly  known as: MICARDIS   Take 80 mg by mouth daily.            Patient Instructions   Postop shoe  Ice  Rest  Follow-up orthopedics 1 to 3 days            Primary Care Physician  Nicole Beltran MD Daniel S Kirschner, MD      No